# Patient Record
Sex: FEMALE | Race: OTHER | HISPANIC OR LATINO
[De-identification: names, ages, dates, MRNs, and addresses within clinical notes are randomized per-mention and may not be internally consistent; named-entity substitution may affect disease eponyms.]

---

## 2020-04-09 ENCOUNTER — APPOINTMENT (OUTPATIENT)
Dept: OBGYN | Facility: CLINIC | Age: 35
End: 2020-04-09

## 2023-07-05 ENCOUNTER — OUTPATIENT (OUTPATIENT)
Dept: OUTPATIENT SERVICES | Facility: HOSPITAL | Age: 38
LOS: 1 days | End: 2023-07-05
Payer: MEDICAID

## 2023-07-05 ENCOUNTER — APPOINTMENT (OUTPATIENT)
Dept: OPHTHALMOLOGY | Facility: CLINIC | Age: 38
End: 2023-07-05
Payer: MEDICAID

## 2023-07-05 DIAGNOSIS — H53.8 OTHER VISUAL DISTURBANCES: ICD-10-CM

## 2023-07-05 PROBLEM — Z00.00 ENCOUNTER FOR PREVENTIVE HEALTH EXAMINATION: Status: ACTIVE | Noted: 2023-07-05

## 2023-07-05 PROCEDURE — 67820 REVISE EYELASHES: CPT | Mod: E1

## 2023-07-05 PROCEDURE — 99203 OFFICE O/P NEW LOW 30 MIN: CPT | Mod: 25

## 2023-07-05 PROCEDURE — 99203 OFFICE O/P NEW LOW 30 MIN: CPT

## 2023-07-05 PROCEDURE — 92004 COMPRE OPH EXAM NEW PT 1/>: CPT

## 2023-07-05 PROCEDURE — 67820 REVISE EYELASHES: CPT | Mod: LT

## 2023-07-12 DIAGNOSIS — H02.831 DERMATOCHALASIS OF RIGHT UPPER EYELID: ICD-10-CM

## 2023-07-12 DIAGNOSIS — H02.054 TRICHIASIS WITHOUT ENTROPION LEFT UPPER EYELID: ICD-10-CM

## 2023-07-12 DIAGNOSIS — H02.88B MEIBOMIAN GLAND DYSFUNCTION LEFT EYE, UPPER AND LOWER EYELIDS: ICD-10-CM

## 2023-07-12 DIAGNOSIS — H04.569 STENOSIS OF UNSPECIFIED LACRIMAL PUNCTUM: ICD-10-CM

## 2023-07-12 DIAGNOSIS — H02.834 DERMATOCHALASIS OF LEFT UPPER EYELID: ICD-10-CM

## 2023-07-12 DIAGNOSIS — H02.88A MEIBOMIAN GLAND DYSFUNCTION RIGHT EYE, UPPER AND LOWER EYELIDS: ICD-10-CM

## 2023-10-11 ENCOUNTER — APPOINTMENT (OUTPATIENT)
Dept: OPHTHALMOLOGY | Facility: CLINIC | Age: 38
End: 2023-10-11
Payer: MEDICAID

## 2023-10-11 ENCOUNTER — OUTPATIENT (OUTPATIENT)
Dept: OUTPATIENT SERVICES | Facility: HOSPITAL | Age: 38
LOS: 1 days | End: 2023-10-11
Payer: MEDICAID

## 2023-10-11 DIAGNOSIS — H53.8 OTHER VISUAL DISTURBANCES: ICD-10-CM

## 2023-10-11 PROCEDURE — 92014 COMPRE OPH EXAM EST PT 1/>: CPT

## 2023-10-23 DIAGNOSIS — H02.88B MEIBOMIAN GLAND DYSFUNCTION LEFT EYE, UPPER AND LOWER EYELIDS: ICD-10-CM

## 2024-04-17 ENCOUNTER — APPOINTMENT (OUTPATIENT)
Dept: OPHTHALMOLOGY | Facility: CLINIC | Age: 39
End: 2024-04-17

## 2024-05-20 ENCOUNTER — APPOINTMENT (OUTPATIENT)
Dept: PLASTIC SURGERY | Facility: CLINIC | Age: 39
End: 2024-05-20
Payer: MEDICAID

## 2024-05-20 DIAGNOSIS — R22.2 LOCALIZED SWELLING, MASS AND LUMP, TRUNK: ICD-10-CM

## 2024-05-20 PROCEDURE — 99203 OFFICE O/P NEW LOW 30 MIN: CPT

## 2024-05-20 NOTE — HISTORY OF PRESENT ILLNESS
[FreeTextEntry1] : 37 y/o female with h/o lumber disc herniation s/p back surgery (Dr. Bartholomew) who presents for evaluation of right buttock mass that has been present for several years.  She reports noting the mass after falling down stairs several years ago.  Patient did not seek medical attention at time of fall.  No associated laceration.  +right buttock ecchymosis after fall.  Since then, the mass is unchanged in size, however it is now associated with new onset pain for approximately 7 months    No prior buttock surgery.  Denies personal or family h/o skin cancer Denies h/o DVT/PE or MRSA infection No h/o DM Non-smoker Occupation:

## 2024-05-20 NOTE — PHYSICAL EXAM
[de-identified] : well-appearing [de-identified] : right medial buttock with raised, tender subcutaneous mass ~ 4-5 cm wide, no overly skin changes, erythema

## 2024-05-20 NOTE — ASSESSMENT
[FreeTextEntry1] : 37 yo F s/p fall several years ago onto right buttock with post-injury mass, now associated with pain and deformity of buttock.  -Recommend CT pelvis to define mass and for surgical planning, r/o hematoma vs fat necrosis vs lipoma -I reviewed the treatment options of observation vs. surgical excision, and I discussed the benefits, risks, and outcomes of each treatment option. -Regarding observation, the risks include progressive mass enlargement, mass effect of surrounding structures. -Regarding surgical excision of mass, the risks include but are not limited to bleeding, infection, hematoma, nerve complications, wound separation, poor wound healing, recurrence of mass, contour defect, scarring, hypertrophic scar/keloid formation, need for unplanned surgery, and dissatisfaction with outcome. -Photos taken. -All questions were answered -Follow up after CT scan

## 2024-07-08 ENCOUNTER — APPOINTMENT (OUTPATIENT)
Dept: PLASTIC SURGERY | Facility: CLINIC | Age: 39
End: 2024-07-08
Payer: MEDICAID

## 2024-07-08 PROCEDURE — 99213 OFFICE O/P EST LOW 20 MIN: CPT

## 2024-07-16 ENCOUNTER — NON-APPOINTMENT (OUTPATIENT)
Age: 39
End: 2024-07-16

## 2024-07-18 ENCOUNTER — APPOINTMENT (OUTPATIENT)
Dept: OBGYN | Facility: CLINIC | Age: 39
End: 2024-07-18

## 2024-07-18 VITALS
HEART RATE: 87 BPM | DIASTOLIC BLOOD PRESSURE: 82 MMHG | SYSTOLIC BLOOD PRESSURE: 130 MMHG | OXYGEN SATURATION: 100 % | HEIGHT: 65 IN | BODY MASS INDEX: 30.49 KG/M2 | WEIGHT: 183 LBS

## 2024-07-18 DIAGNOSIS — Z00.00 ENCOUNTER FOR GENERAL ADULT MEDICAL EXAMINATION W/OUT ABNORMAL FINDINGS: ICD-10-CM

## 2024-07-18 PROCEDURE — 59840 INDUCED ABORTION D&C: CPT

## 2024-07-18 PROCEDURE — 76815 OB US LIMITED FETUS(S): CPT | Mod: 26

## 2024-07-18 PROCEDURE — 99215 OFFICE O/P EST HI 40 MIN: CPT | Mod: 25

## 2024-07-18 RX ADMIN — MEDROXYPROGESTERONE ACETATE 0 MG/ML: 150 INJECTION, SUSPENSION INTRAMUSCULAR at 00:00

## 2024-07-22 LAB
C TRACH RRNA SPEC QL NAA+PROBE: NOT DETECTED
CORE LAB BIOPSY: NORMAL
N GONORRHOEA RRNA SPEC QL NAA+PROBE: NOT DETECTED
SOURCE AMPLIFICATION: NORMAL
SOURCE AMPLIFICATION: NORMAL
T VAGINALIS RRNA SPEC QL NAA+PROBE: NOT DETECTED

## 2024-08-01 ENCOUNTER — APPOINTMENT (OUTPATIENT)
Dept: OBGYN | Facility: CLINIC | Age: 39
End: 2024-08-01

## 2024-08-01 VITALS — OXYGEN SATURATION: 100 % | HEIGHT: 65 IN | HEART RATE: 63 BPM | WEIGHT: 184.31 LBS | BODY MASS INDEX: 30.71 KG/M2

## 2024-08-01 PROCEDURE — 99203 OFFICE O/P NEW LOW 30 MIN: CPT
